# Patient Record
Sex: FEMALE | Race: WHITE | Employment: UNEMPLOYED | URBAN - METROPOLITAN AREA
[De-identification: names, ages, dates, MRNs, and addresses within clinical notes are randomized per-mention and may not be internally consistent; named-entity substitution may affect disease eponyms.]

---

## 2018-10-11 ENCOUNTER — HOSPITAL ENCOUNTER (OUTPATIENT)
Dept: CARDIOLOGY | Age: 83
Discharge: HOME | End: 2018-10-11
Payer: MEDICARE

## 2018-10-11 LAB
LV EF: 55 %
LVEF MODALITY: NORMAL

## 2018-10-11 PROCEDURE — 93306 TTE W/DOPPLER COMPLETE: CPT

## 2021-12-26 ENCOUNTER — APPOINTMENT (OUTPATIENT)
Dept: GENERAL RADIOLOGY | Age: 86
DRG: 552 | End: 2021-12-26
Payer: MEDICARE

## 2021-12-26 ENCOUNTER — HOSPITAL ENCOUNTER (INPATIENT)
Age: 86
LOS: 3 days | Discharge: NURSING, REHAB OR LONG-TERM CARE | DRG: 552 | End: 2021-12-29
Attending: EMERGENCY MEDICINE | Admitting: INTERNAL MEDICINE
Payer: MEDICARE

## 2021-12-26 DIAGNOSIS — D72.829 LEUKOCYTOSIS, UNSPECIFIED TYPE: ICD-10-CM

## 2021-12-26 DIAGNOSIS — E87.8 HYPOCHLOREMIA: ICD-10-CM

## 2021-12-26 DIAGNOSIS — E87.1 HYPONATREMIA: ICD-10-CM

## 2021-12-26 DIAGNOSIS — R26.2 AMBULATORY DYSFUNCTION: Primary | ICD-10-CM

## 2021-12-26 LAB
ALBUMIN SERPL-MCNC: 3.9 G/DL (ref 3.5–5.2)
ALP BLD-CCNC: 165 U/L (ref 35–104)
ALT SERPL-CCNC: 15 U/L (ref 0–32)
ANION GAP SERPL CALCULATED.3IONS-SCNC: 16 MMOL/L (ref 7–16)
AST SERPL-CCNC: 30 U/L (ref 0–31)
BACTERIA: ABNORMAL /HPF
BASOPHILS ABSOLUTE: 0.04 E9/L (ref 0–0.2)
BASOPHILS RELATIVE PERCENT: 0.3 % (ref 0–2)
BILIRUB SERPL-MCNC: 0.7 MG/DL (ref 0–1.2)
BILIRUBIN URINE: NEGATIVE
BLOOD, URINE: ABNORMAL
BUN BLDV-MCNC: 15 MG/DL (ref 6–23)
CALCIUM SERPL-MCNC: 9.5 MG/DL (ref 8.6–10.2)
CHLORIDE BLD-SCNC: 88 MMOL/L (ref 98–107)
CLARITY: CLEAR
CO2: 24 MMOL/L (ref 22–29)
COLOR: YELLOW
CREAT SERPL-MCNC: 0.5 MG/DL (ref 0.5–1)
EOSINOPHILS ABSOLUTE: 0.01 E9/L (ref 0.05–0.5)
EOSINOPHILS RELATIVE PERCENT: 0.1 % (ref 0–6)
GFR AFRICAN AMERICAN: >60
GFR NON-AFRICAN AMERICAN: >60 ML/MIN/1.73
GLUCOSE BLD-MCNC: 104 MG/DL (ref 74–99)
GLUCOSE URINE: NEGATIVE MG/DL
HCT VFR BLD CALC: 40.5 % (ref 34–48)
HEMOGLOBIN: 13.4 G/DL (ref 11.5–15.5)
IMMATURE GRANULOCYTES #: 0.08 E9/L
IMMATURE GRANULOCYTES %: 0.5 % (ref 0–5)
KETONES, URINE: ABNORMAL MG/DL
LEUKOCYTE ESTERASE, URINE: NEGATIVE
LYMPHOCYTES ABSOLUTE: 0.83 E9/L (ref 1.5–4)
LYMPHOCYTES RELATIVE PERCENT: 5.2 % (ref 20–42)
MCH RBC QN AUTO: 30.9 PG (ref 26–35)
MCHC RBC AUTO-ENTMCNC: 33.1 % (ref 32–34.5)
MCV RBC AUTO: 93.3 FL (ref 80–99.9)
MONOCYTES ABSOLUTE: 1.21 E9/L (ref 0.1–0.95)
MONOCYTES RELATIVE PERCENT: 7.6 % (ref 2–12)
NEUTROPHILS ABSOLUTE: 13.69 E9/L (ref 1.8–7.3)
NEUTROPHILS RELATIVE PERCENT: 86.3 % (ref 43–80)
NITRITE, URINE: NEGATIVE
PDW BLD-RTO: 12.2 FL (ref 11.5–15)
PH UA: 6.5 (ref 5–9)
PLATELET # BLD: 320 E9/L (ref 130–450)
PMV BLD AUTO: 11 FL (ref 7–12)
POTASSIUM SERPL-SCNC: 3.8 MMOL/L (ref 3.5–5)
PROTEIN UA: NEGATIVE MG/DL
RBC # BLD: 4.34 E12/L (ref 3.5–5.5)
RBC UA: ABNORMAL /HPF (ref 0–2)
SODIUM BLD-SCNC: 128 MMOL/L (ref 132–146)
SPECIFIC GRAVITY UA: 1.01 (ref 1–1.03)
TOTAL PROTEIN: 7.8 G/DL (ref 6.4–8.3)
TROPONIN, HIGH SENSITIVITY: 21 NG/L (ref 0–9)
TROPONIN, HIGH SENSITIVITY: 26 NG/L (ref 0–9)
UROBILINOGEN, URINE: 0.2 E.U./DL
WBC # BLD: 15.9 E9/L (ref 4.5–11.5)
WBC UA: ABNORMAL /HPF (ref 0–5)

## 2021-12-26 PROCEDURE — 81001 URINALYSIS AUTO W/SCOPE: CPT

## 2021-12-26 PROCEDURE — G0378 HOSPITAL OBSERVATION PER HR: HCPCS

## 2021-12-26 PROCEDURE — 72100 X-RAY EXAM L-S SPINE 2/3 VWS: CPT

## 2021-12-26 PROCEDURE — 1200000000 HC SEMI PRIVATE

## 2021-12-26 PROCEDURE — 6370000000 HC RX 637 (ALT 250 FOR IP): Performed by: INTERNAL MEDICINE

## 2021-12-26 PROCEDURE — 99223 1ST HOSP IP/OBS HIGH 75: CPT | Performed by: INTERNAL MEDICINE

## 2021-12-26 PROCEDURE — 84484 ASSAY OF TROPONIN QUANT: CPT

## 2021-12-26 PROCEDURE — 2580000003 HC RX 258: Performed by: EMERGENCY MEDICINE

## 2021-12-26 PROCEDURE — 2580000003 HC RX 258: Performed by: INTERNAL MEDICINE

## 2021-12-26 PROCEDURE — 80053 COMPREHEN METABOLIC PANEL: CPT

## 2021-12-26 PROCEDURE — 85025 COMPLETE CBC W/AUTO DIFF WBC: CPT

## 2021-12-26 PROCEDURE — 99284 EMERGENCY DEPT VISIT MOD MDM: CPT

## 2021-12-26 PROCEDURE — 93005 ELECTROCARDIOGRAM TRACING: CPT | Performed by: PHYSICIAN ASSISTANT

## 2021-12-26 PROCEDURE — 71046 X-RAY EXAM CHEST 2 VIEWS: CPT

## 2021-12-26 RX ORDER — POLYETHYLENE GLYCOL 3350 17 G/17G
17 POWDER, FOR SOLUTION ORAL DAILY PRN
Status: DISCONTINUED | OUTPATIENT
Start: 2021-12-26 | End: 2021-12-29 | Stop reason: HOSPADM

## 2021-12-26 RX ORDER — ACETAMINOPHEN 650 MG/1
650 SUPPOSITORY RECTAL EVERY 6 HOURS PRN
Status: DISCONTINUED | OUTPATIENT
Start: 2021-12-26 | End: 2021-12-29 | Stop reason: HOSPADM

## 2021-12-26 RX ORDER — METHOCARBAMOL 500 MG/1
1000 TABLET, FILM COATED ORAL 4 TIMES DAILY
Status: DISCONTINUED | OUTPATIENT
Start: 2021-12-26 | End: 2021-12-29 | Stop reason: HOSPADM

## 2021-12-26 RX ORDER — SODIUM CHLORIDE 9 MG/ML
25 INJECTION, SOLUTION INTRAVENOUS PRN
Status: DISCONTINUED | OUTPATIENT
Start: 2021-12-26 | End: 2021-12-29 | Stop reason: HOSPADM

## 2021-12-26 RX ORDER — SODIUM CHLORIDE 0.9 % (FLUSH) 0.9 %
10 SYRINGE (ML) INJECTION PRN
Status: DISCONTINUED | OUTPATIENT
Start: 2021-12-26 | End: 2021-12-29 | Stop reason: HOSPADM

## 2021-12-26 RX ORDER — SIMVASTATIN 20 MG
20 TABLET ORAL NIGHTLY
COMMUNITY

## 2021-12-26 RX ORDER — ACETAMINOPHEN 325 MG/1
650 TABLET ORAL EVERY 6 HOURS PRN
Status: DISCONTINUED | OUTPATIENT
Start: 2021-12-26 | End: 2021-12-29 | Stop reason: HOSPADM

## 2021-12-26 RX ORDER — METOPROLOL SUCCINATE 50 MG/1
50 TABLET, EXTENDED RELEASE ORAL DAILY
COMMUNITY

## 2021-12-26 RX ORDER — LEVOTHYROXINE SODIUM 0.05 MG/1
50 TABLET ORAL DAILY
COMMUNITY

## 2021-12-26 RX ORDER — ERGOCALCIFEROL 1.25 MG/1
50000 CAPSULE ORAL
COMMUNITY

## 2021-12-26 RX ORDER — SODIUM CHLORIDE 0.9 % (FLUSH) 0.9 %
10 SYRINGE (ML) INJECTION EVERY 12 HOURS SCHEDULED
Status: DISCONTINUED | OUTPATIENT
Start: 2021-12-26 | End: 2021-12-29 | Stop reason: HOSPADM

## 2021-12-26 RX ORDER — LIDOCAINE 4 G/G
1 PATCH TOPICAL DAILY
Status: DISCONTINUED | OUTPATIENT
Start: 2021-12-27 | End: 2021-12-29 | Stop reason: HOSPADM

## 2021-12-26 RX ORDER — HYDROCODONE BITARTRATE AND ACETAMINOPHEN 5; 325 MG/1; MG/1
1 TABLET ORAL EVERY 6 HOURS PRN
Status: DISCONTINUED | OUTPATIENT
Start: 2021-12-26 | End: 2021-12-29 | Stop reason: HOSPADM

## 2021-12-26 RX ORDER — SODIUM CHLORIDE 9 MG/ML
INJECTION, SOLUTION INTRAVENOUS CONTINUOUS
Status: DISCONTINUED | OUTPATIENT
Start: 2021-12-26 | End: 2021-12-29 | Stop reason: HOSPADM

## 2021-12-26 RX ORDER — 0.9 % SODIUM CHLORIDE 0.9 %
1000 INTRAVENOUS SOLUTION INTRAVENOUS ONCE
Status: COMPLETED | OUTPATIENT
Start: 2021-12-26 | End: 2021-12-26

## 2021-12-26 RX ADMIN — METHOCARBAMOL TABLETS 1000 MG: 500 TABLET, COATED ORAL at 22:39

## 2021-12-26 RX ADMIN — SODIUM CHLORIDE: 9 INJECTION, SOLUTION INTRAVENOUS at 22:40

## 2021-12-26 RX ADMIN — SODIUM CHLORIDE 1000 ML: 9 INJECTION, SOLUTION INTRAVENOUS at 18:00

## 2021-12-26 NOTE — ED NOTES
Department of Emergency Medicine  FIRST PROVIDER TRIAGE NOTE             Independent MLP           12/26/21  12:00 PM EST    Date of Encounter: 12/26/21   MRN: [id number]      HPI: Yousuf Chacon is a 80 y.o. female who presents to the ED for Leg Pain (leg pain, weakness)     Patient presenting with weakness and shakiness the past few days. ROS: Negative for cp or sob. PE: Gen Appearance/Constitutional: alert  HEENT: NC/NT. PERRLA,  Airway patent. Initial Plan of Care: All treatment areas with department are currently occupied. Plan to order/Initiate the following while awaiting opening in ED: labs, EKG and imaging studies.     Initial Plan of Care: Initiate Treatment-Testing, Proceed toTreatment Area When Bed Available for ED Attending/MLP to Continue Care    Electronically signed by Rodney Chaves PA-C   DD: 12/26/21       Rodney Chaves PA-C  12/26/21 4489

## 2021-12-26 NOTE — ED PROVIDER NOTES
HPI:  12/26/21, Time: 3:11 PM TRINITY Mayer is a 80 y.o. female presenting to the ED for right hip pain which has been present since September. Symptoms have been moderate in severity, constant, worsened with walking and movement, alleviated by nothing. She states that she saw her doctor last week who sent her to start imaging as an outpatient to undergo x-rays of her hip. I was able to review the results in care everywhere. Patient was noted to have mild to moderate degenerative changes in her hips. She states she was placed on pain medication. States that after taking the pain medication she developed weakness in both of her legs. She said no falls or trauma. No bowel or bladder incontinence or retention, saddle anesthesia, or focal numbness or weakness. She has been ambulatory. No fevers or recent illness. States that she has had injections in her hips in the past which have helped her pain. States she did develop some burning with urination today. She also states that she has had a cough occasionally productive of sputum since the summer. No known exposures to COVID-19. No fevers, chest pain, or difficulty breathing. Review of Systems:   Pertinent positives and negatives are stated within HPI, all other systems reviewed and are negative.          --------------------------------------------- PAST HISTORY ---------------------------------------------  Past Medical History:  has no past medical history on file. Past Surgical History:  has no past surgical history on file. Social History:      Family History: family history is not on file. The patients home medications have been reviewed.     Allergies: Pcn [penicillins]    -------------------------------------------------- RESULTS -------------------------------------------------  All laboratory and radiology results have been personally reviewed by myself   LABS:  Results for orders placed or performed during the hospital encounter of 12/26/21   CBC Auto Differential   Result Value Ref Range    WBC 15.9 (H) 4.5 - 11.5 E9/L    RBC 4.34 3.50 - 5.50 E12/L    Hemoglobin 13.4 11.5 - 15.5 g/dL    Hematocrit 40.5 34.0 - 48.0 %    MCV 93.3 80.0 - 99.9 fL    MCH 30.9 26.0 - 35.0 pg    MCHC 33.1 32.0 - 34.5 %    RDW 12.2 11.5 - 15.0 fL    Platelets 383 660 - 661 E9/L    MPV 11.0 7.0 - 12.0 fL    Neutrophils % 86.3 (H) 43.0 - 80.0 %    Immature Granulocytes % 0.5 0.0 - 5.0 %    Lymphocytes % 5.2 (L) 20.0 - 42.0 %    Monocytes % 7.6 2.0 - 12.0 %    Eosinophils % 0.1 0.0 - 6.0 %    Basophils % 0.3 0.0 - 2.0 %    Neutrophils Absolute 13.69 (H) 1.80 - 7.30 E9/L    Immature Granulocytes # 0.08 E9/L    Lymphocytes Absolute 0.83 (L) 1.50 - 4.00 E9/L    Monocytes Absolute 1.21 (H) 0.10 - 0.95 E9/L    Eosinophils Absolute 0.01 (L) 0.05 - 0.50 E9/L    Basophils Absolute 0.04 0.00 - 0.20 E9/L   Comprehensive Metabolic Panel   Result Value Ref Range    Sodium 128 (L) 132 - 146 mmol/L    Potassium 3.8 3.5 - 5.0 mmol/L    Chloride 88 (L) 98 - 107 mmol/L    CO2 24 22 - 29 mmol/L    Anion Gap 16 7 - 16 mmol/L    Glucose 104 (H) 74 - 99 mg/dL    BUN 15 6 - 23 mg/dL    CREATININE 0.5 0.5 - 1.0 mg/dL    GFR Non-African American >60 >=60 mL/min/1.73    GFR African American >60     Calcium 9.5 8.6 - 10.2 mg/dL    Total Protein 7.8 6.4 - 8.3 g/dL    Albumin 3.9 3.5 - 5.2 g/dL    Total Bilirubin 0.7 0.0 - 1.2 mg/dL    Alkaline Phosphatase 165 (H) 35 - 104 U/L    ALT 15 0 - 32 U/L    AST 30 0 - 31 U/L   Troponin   Result Value Ref Range    Troponin, High Sensitivity 26 (H) 0 - 9 ng/L   Urinalysis   Result Value Ref Range    Color, UA Yellow Straw/Yellow    Clarity, UA Clear Clear    Glucose, Ur Negative Negative mg/dL    Bilirubin Urine Negative Negative    Ketones, Urine TRACE (A) Negative mg/dL    Specific Gravity, UA 1.010 1.005 - 1.030    Blood, Urine TRACE-LYSED Negative    pH, UA 6.5 5.0 - 9.0    Protein, UA Negative Negative mg/dL    Urobilinogen, Urine 0.2 ROM. No leg swelling. No numbness. Skin: warm and dry without rash  Neurologic: GCS 15, no focal motor or sensory deficits. 5/5 strength of bilateral upper and lower extremities. Normal sensation to all extremities. Psych: Normal Affect. Behavior normal.      ------------------------------ ED COURSE/MEDICAL DECISION MAKING----------------------  Medications   0.9 % sodium chloride bolus (1,000 mLs IntraVENous New Bag 12/26/21 1800)       Medical Decision Making/ED COURSE:   Patient is a 79-year-old female presenting with atraumatic right hip pain, dysuria, and leg weakness. Hip pain appears radicular from back on exam. She has no signs or symptoms of spinal cord compression and no history of trauma. In the ED, patient was hemodynamically stable and afebrile. On exam, objectively the patient had no weakness to her extremities. She was nontoxic on evaluation. No evidence of septic joint to her hip. Labs and imaging obtained. I reviewed and interpreted labs. Labs were significant for hyponatremia and leukocytosis. She has no fevers or obvious source of infection. She does appear dehydrated on exam. She as hydrated. Imaging unremarkable. Patient ambulated, and she states she does not feel safe for home and believes she may need rehab. Will admit for hydration and PT/OT. Patient remained hemodynamically stable throughout ED course. ED Course as of 12/26/21 2329   Sun Dec 26, 2021   9243 I reviewed the patient's chart. ECHO 10/11/18:   Left ventricular size is grossly normal.   Mild left ventricular concentric hypertrophy noted. Ejection fraction is visually estimated at 55%. No regional wall motion abnormalities seen. Normal left ventricular diastolic filling pattern for age. Severe aortic stenosis is present. Mild-to-moderate aortic regurgitation is noted. [JA]   1801 EKG: This EKG is signed and interpreted by me.     Rate: 94  Rhythm: Sinus  Interpretation: Sinus rhythm, left axis deviation, LVH, normal MD interval, normal QRS, normal QT interval, no acute ST or T wave changes  Comparison: no previous EKG available [JA]   1952 Patient weak with ambulation and feels unsafe for discharge home. She is requesting admission for placement to rehab. [JA]   2004 Hospitalist was consulted. Dr. Korin Donnelly accepted the patient for admission. [JA]      ED Course User Index  [JA] Kelli Rea MD       Counseling: The emergency provider has spoken with the patient and discussed todays results, in addition to providing specific details for the plan of care and counseling regarding the diagnosis and prognosis. Questions are answered at this time and they are agreeable with the plan.      --------------------------------- IMPRESSION AND DISPOSITION ---------------------------------    IMPRESSION  1. Ambulatory dysfunction    2. Hyponatremia    3. Leukocytosis, unspecified type        DISPOSITION  Disposition: Admit to med/surg floor  Patient condition is stable      NOTE: This report was transcribed using voice recognition software.  Every effort was made to ensure accuracy; however, inadvertent computerized transcription errors may be present    I, Kelli Rea MD, am the primary provider of this record       Kelli Rea MD  12/26/21 5789

## 2021-12-27 PROBLEM — M54.9 INTRACTABLE BACK PAIN: Status: ACTIVE | Noted: 2021-12-27

## 2021-12-27 LAB
ANION GAP SERPL CALCULATED.3IONS-SCNC: 15 MMOL/L (ref 7–16)
BASOPHILS ABSOLUTE: 0.05 E9/L (ref 0–0.2)
BASOPHILS RELATIVE PERCENT: 0.4 % (ref 0–2)
BUN BLDV-MCNC: 11 MG/DL (ref 6–23)
CALCIUM SERPL-MCNC: 9.3 MG/DL (ref 8.6–10.2)
CHLORIDE BLD-SCNC: 97 MMOL/L (ref 98–107)
CO2: 23 MMOL/L (ref 22–29)
CREAT SERPL-MCNC: 0.6 MG/DL (ref 0.5–1)
EKG ATRIAL RATE: 94 BPM
EKG P AXIS: 46 DEGREES
EKG P-R INTERVAL: 190 MS
EKG Q-T INTERVAL: 354 MS
EKG QRS DURATION: 84 MS
EKG QTC CALCULATION (BAZETT): 442 MS
EKG R AXIS: -62 DEGREES
EKG T AXIS: 81 DEGREES
EKG VENTRICULAR RATE: 94 BPM
EOSINOPHILS ABSOLUTE: 0.02 E9/L (ref 0.05–0.5)
EOSINOPHILS RELATIVE PERCENT: 0.1 % (ref 0–6)
GFR AFRICAN AMERICAN: >60
GFR NON-AFRICAN AMERICAN: >60 ML/MIN/1.73
GLUCOSE BLD-MCNC: 182 MG/DL (ref 74–99)
HCT VFR BLD CALC: 43.2 % (ref 34–48)
HEMOGLOBIN: 14.4 G/DL (ref 11.5–15.5)
IMMATURE GRANULOCYTES #: 0.07 E9/L
IMMATURE GRANULOCYTES %: 0.5 % (ref 0–5)
LYMPHOCYTES ABSOLUTE: 1.26 E9/L (ref 1.5–4)
LYMPHOCYTES RELATIVE PERCENT: 8.8 % (ref 20–42)
MCH RBC QN AUTO: 30.6 PG (ref 26–35)
MCHC RBC AUTO-ENTMCNC: 33.3 % (ref 32–34.5)
MCV RBC AUTO: 91.9 FL (ref 80–99.9)
MONOCYTES ABSOLUTE: 1.21 E9/L (ref 0.1–0.95)
MONOCYTES RELATIVE PERCENT: 8.5 % (ref 2–12)
NEUTROPHILS ABSOLUTE: 11.64 E9/L (ref 1.8–7.3)
NEUTROPHILS RELATIVE PERCENT: 81.7 % (ref 43–80)
PDW BLD-RTO: 12.3 FL (ref 11.5–15)
PLATELET # BLD: 395 E9/L (ref 130–450)
PMV BLD AUTO: 10.9 FL (ref 7–12)
POTASSIUM REFLEX MAGNESIUM: 3.6 MMOL/L (ref 3.5–5)
RBC # BLD: 4.7 E12/L (ref 3.5–5.5)
SODIUM BLD-SCNC: 135 MMOL/L (ref 132–146)
WBC # BLD: 14.3 E9/L (ref 4.5–11.5)

## 2021-12-27 PROCEDURE — 80048 BASIC METABOLIC PNL TOTAL CA: CPT

## 2021-12-27 PROCEDURE — 97165 OT EVAL LOW COMPLEX 30 MIN: CPT

## 2021-12-27 PROCEDURE — 36415 COLL VENOUS BLD VENIPUNCTURE: CPT

## 2021-12-27 PROCEDURE — 6360000002 HC RX W HCPCS: Performed by: INTERNAL MEDICINE

## 2021-12-27 PROCEDURE — G0378 HOSPITAL OBSERVATION PER HR: HCPCS

## 2021-12-27 PROCEDURE — 99233 SBSQ HOSP IP/OBS HIGH 50: CPT | Performed by: INTERNAL MEDICINE

## 2021-12-27 PROCEDURE — 96372 THER/PROPH/DIAG INJ SC/IM: CPT

## 2021-12-27 PROCEDURE — 85025 COMPLETE CBC W/AUTO DIFF WBC: CPT

## 2021-12-27 PROCEDURE — 6370000000 HC RX 637 (ALT 250 FOR IP): Performed by: INTERNAL MEDICINE

## 2021-12-27 PROCEDURE — 97161 PT EVAL LOW COMPLEX 20 MIN: CPT

## 2021-12-27 PROCEDURE — 97530 THERAPEUTIC ACTIVITIES: CPT

## 2021-12-27 PROCEDURE — 93010 ELECTROCARDIOGRAM REPORT: CPT | Performed by: INTERNAL MEDICINE

## 2021-12-27 PROCEDURE — 1200000000 HC SEMI PRIVATE

## 2021-12-27 RX ORDER — METOPROLOL SUCCINATE 50 MG/1
50 TABLET, EXTENDED RELEASE ORAL DAILY
Status: DISCONTINUED | OUTPATIENT
Start: 2021-12-27 | End: 2021-12-29 | Stop reason: HOSPADM

## 2021-12-27 RX ORDER — LEVOTHYROXINE SODIUM 0.05 MG/1
50 TABLET ORAL DAILY
Status: DISCONTINUED | OUTPATIENT
Start: 2021-12-28 | End: 2021-12-29 | Stop reason: HOSPADM

## 2021-12-27 RX ORDER — ATORVASTATIN CALCIUM 20 MG/1
20 TABLET, FILM COATED ORAL DAILY
Status: DISCONTINUED | OUTPATIENT
Start: 2021-12-27 | End: 2021-12-29 | Stop reason: HOSPADM

## 2021-12-27 RX ADMIN — METHOCARBAMOL TABLETS 1000 MG: 500 TABLET, COATED ORAL at 13:00

## 2021-12-27 RX ADMIN — METOPROLOL SUCCINATE 50 MG: 50 TABLET, EXTENDED RELEASE ORAL at 21:26

## 2021-12-27 RX ADMIN — METHOCARBAMOL TABLETS 1000 MG: 500 TABLET, COATED ORAL at 17:09

## 2021-12-27 RX ADMIN — METHOCARBAMOL TABLETS 1000 MG: 500 TABLET, COATED ORAL at 09:00

## 2021-12-27 RX ADMIN — HYDROCODONE BITARTRATE AND ACETAMINOPHEN 1 TABLET: 5; 325 TABLET ORAL at 01:23

## 2021-12-27 RX ADMIN — HYDROCODONE BITARTRATE AND ACETAMINOPHEN 1 TABLET: 5; 325 TABLET ORAL at 21:27

## 2021-12-27 RX ADMIN — HYDROCODONE BITARTRATE AND ACETAMINOPHEN 1 TABLET: 5; 325 TABLET ORAL at 09:41

## 2021-12-27 RX ADMIN — ENOXAPARIN SODIUM 40 MG: 100 INJECTION SUBCUTANEOUS at 09:41

## 2021-12-27 RX ADMIN — ATORVASTATIN CALCIUM 20 MG: 20 TABLET, FILM COATED ORAL at 21:26

## 2021-12-27 RX ADMIN — POLYETHYLENE GLYCOL 3350 17 G: 17 POWDER, FOR SOLUTION ORAL at 09:41

## 2021-12-27 ASSESSMENT — PAIN DESCRIPTION - FREQUENCY
FREQUENCY: INTERMITTENT
FREQUENCY: INTERMITTENT

## 2021-12-27 ASSESSMENT — PAIN SCALES - GENERAL
PAINLEVEL_OUTOF10: 8
PAINLEVEL_OUTOF10: 8
PAINLEVEL_OUTOF10: 7

## 2021-12-27 ASSESSMENT — PAIN DESCRIPTION - PAIN TYPE
TYPE: ACUTE PAIN
TYPE: ACUTE PAIN

## 2021-12-27 ASSESSMENT — PAIN DESCRIPTION - PROGRESSION
CLINICAL_PROGRESSION: GRADUALLY IMPROVING
CLINICAL_PROGRESSION: GRADUALLY IMPROVING

## 2021-12-27 ASSESSMENT — PAIN DESCRIPTION - DESCRIPTORS
DESCRIPTORS: ACHING
DESCRIPTORS: ACHING

## 2021-12-27 ASSESSMENT — PAIN DESCRIPTION - ONSET
ONSET: GRADUAL
ONSET: GRADUAL

## 2021-12-27 ASSESSMENT — PAIN DESCRIPTION - LOCATION
LOCATION: HIP
LOCATION: HIP

## 2021-12-27 ASSESSMENT — PAIN DESCRIPTION - ORIENTATION
ORIENTATION: RIGHT
ORIENTATION: MID;RIGHT

## 2021-12-27 ASSESSMENT — PAIN - FUNCTIONAL ASSESSMENT: PAIN_FUNCTIONAL_ASSESSMENT: PREVENTS OR INTERFERES SOME ACTIVE ACTIVITIES AND ADLS

## 2021-12-27 ASSESSMENT — PAIN DESCRIPTION - DIRECTION: RADIATING_TOWARDS: BACK

## 2021-12-27 NOTE — PLAN OF CARE
Problem: Falls - Risk of:  Goal: Will remain free from falls  Description: Will remain free from falls  Outcome: Met This Shift  Goal: Absence of physical injury  Description: Absence of physical injury  Outcome: Met This Shift     Problem: Pain:  Description: Pain management should include both nonpharmacologic and pharmacologic interventions.   Goal: Pain level will decrease  Description: Pain level will decrease  Outcome: Ongoing  Goal: Control of acute pain  Description: Control of acute pain  Outcome: Ongoing

## 2021-12-27 NOTE — PROGRESS NOTES
normal rate, normal S1 and S2 and no carotid bruits  Abdomen: soft, non-tender, non-distended, normal bowel sounds, no masses or organomegaly  Extremities: no cyanosis, no clubbing and no edema  Neurologic: no cranial nerve deficit and speech normal        Recent Labs     12/26/21  1515 12/27/21  0810   * 135   K 3.8 3.6   CL 88* 97*   CO2 24 23   BUN 15 11   CREATININE 0.5 0.6   GLUCOSE 104* 182*   CALCIUM 9.5 9.3       Recent Labs     12/26/21  1515 12/27/21  0810   WBC 15.9* 14.3*   RBC 4.34 4.70   HGB 13.4 14.4   HCT 40.5 43.2   MCV 93.3 91.9   MCH 30.9 30.6   MCHC 33.1 33.3   RDW 12.2 12.3    395   MPV 11.0 10.9         NOTE: This report was transcribed using voice recognition software. Every effort was made to ensure accuracy; however, inadvertent computerized transcription errors may be present.   Electronically signed by Vivi Beard MD on 12/27/2021 at 3:57 PM

## 2021-12-27 NOTE — H&P
[facility] History and Physical      CHIEF COMPLAINT:  Hip pain    History of Present Illness: 69-year-old female who presents to the hospital due to pain in her hips and legs for the past few days. She states that after ambulating she developed the pain that initially started on the left buttock radiating along the posterior aspect of her left leg. Her pain continued and she now feels it going down the right leg as well. Has limited her ability to ambulate. He therefore presents to ED for further evaluation. Denies any fall. No bladder or bowel dysfunction. Due to her inability to ambulate she has not been able to eat and drink well. Presented to the ED for further evaluation of this. X-ray showed no fracture her back. REVIEW OF SYSTEMS:  A comprehensive 14 point review of systems was negative except for: what is in the HPI    PMH:  No past medical history on file. Surgical History:  No past surgical history on file.     Medications Prior to Admission:    Prior to Admission medications    Not on File       Allergies:    Pcn [penicillins]    Social History:      No current tobacco use  Family History:   Noncontributory    PHYSICAL EXAM:  Vitals:  BP (!) 185/81   Pulse 96   Temp 98.5 °F (36.9 °C) (Oral)   Resp 16   SpO2 94%   General Appearance: alert and oriented to person, place and time and in no acute distress  Skin: warm and dry, turgor not diminished  Head: normocephalic and atraumatic  Eyes: pupils equal, round, and reactive to light, extraocular eye movements intact, conjunctivae normal  Neck: neck supple and non tender without mass   Pulmonary/Chest: clear to auscultation bilaterally- no wheezes, rales or rhonchi, normal air movement, no respiratory distress  Cardiovascular: normal rate, normal S1 and S2 and 3 out of 6 systolic ejection murmur: She recalls being told having this  Abdomen: soft, non-tender, non-distended, normal bowel sounds, no masses or organomegaly  Extremities: no cyanosis, no clubbing and no edema. Strength evaluation of legs limited as she reports low effort due to weakness  Neurologic: no cranial nerve deficit and speech normal        LABS:  Recent Labs     12/26/21  1515   *   K 3.8   CL 88*   CO2 24   BUN 15   CREATININE 0.5   GLUCOSE 104*   CALCIUM 9.5       Recent Labs     12/26/21  1515   WBC 15.9*   RBC 4.34   HGB 13.4   HCT 40.5   MCV 93.3   MCH 30.9   MCHC 33.1   RDW 12.2      MPV 11.0       No results for input(s): POCGLU in the last 72 hours. Radiology:   XR LUMBAR SPINE (2-3 VIEWS)   Final Result   1. No definite acute osseous abnormality seen. 2. Advanced degenerative change. Scoliosis as noted above. XR CHEST (2 VW)   Final Result   Patchy bibasilar opacities. Differential includes atelectasis/scarring and   infection. EKG: No acute normality    ASSESSMENT/PLAN:  Ambulatory dysfunction  Acute lower back pain with radiculopathy  DJD of lumbar spine  Analgesics  PT/OT  Referral to social work for placement  May need further imaging if symptoms persist after a month    Hyponatremia  Generalized weakness  Likely hypovolemic. IV fluids and trend    Severe aortic stenosis  Avoid fluid overload  Follow-up with cardiology as outpatient    Code Status: DNR arrest  DVT prophylaxis: Lovenox      NOTE: This report was transcribed using voice recognition software. Every effort was made to ensure accuracy; however, inadvertent computerized transcription errors may be present.   Electronically signed by Muriel Queen MD on 12/26/2021 at 9:06 PM

## 2021-12-27 NOTE — CARE COORDINATION
Met with about diagnosis and discharge plan of care. Pt admit for ambulatory dysfunction. Pt lives alone in ranch home, 4 steps in. Has cane, wheeled walker and handicapped bathroom at home with grab bars. Pt was independent at home prior to admit but having difficulty at home recently. No home care services. Therapies completed. Pt would like [facility] with Milady at [facility].  Await call back-o    Referral given to Milady at [facility]

## 2021-12-27 NOTE — PROGRESS NOTES
Physical Therapy    Facility/Department: [facility] SURGERY  Initial Assessment    NAME: Navdeep Arcos  : 1930  MRN: [id number]    Date of Service: 2021      Attending Provider:  Andrés Britton MD    Evaluating PT:  Severiano Chill. Garland Vance P.T. Room #:  3328/3498-Y  Diagnosis:  Hypochloremia [E87.8]  Hyponatremia [E87.1]  Ambulatory dysfunction [R26.2]  Leukocytosis, unspecified type [D72.829], radiculopathy BLE  Precautions:  Falls    SUBJECTIVE:    Pt lives alone in a 1 story home with 4 stairs and 1 rail to enter. Pt ambulated with no AD, but has a cane and ww if needed. OBJECTIVE:   Initial Evaluation  Date: 21 Treatment Short Term/ Long Term   Goals   Was pt agreeable to Eval/treatment? yes     Does pt have pain? 7/10 RLE near her R hip while lying, but travels down RLE with standing and amb. Bed Mobility  Rolling: supervision  Supine to sit: supervision  Sit to supine: NA  Scooting: supervision  Independent    Transfers Sit to stand: MIN A  Stand to sit: MIN A  Stand pivot: MIN A with ww  Independent    Ambulation   15 feet x 2 reps with ww MIN A  150 feet with ww if needed Independent    Stair negotiation: ascended and descended NA, pt c/o pain and fatigue that limited amb distance. 4 steps with 1 rail Independent    AM-PAC 6 Clicks 81/38       BLE ROM is WFL. BLE strength is grossly 4-/5 to 4/5.    Sensation:  Pt denies numbness and tingling to extremities  Edema:  None note  Balance: sitting is supervision and standing with ww is SBA/MIN A  Endurance: fair-    Patient education  Pt educated on hand placement during transfers and gait sequence with ww    Patient response to education:   Pt verbalized understanding Pt demonstrated skill Pt requires further education in this area   yes Yes with VCs yes     ASSESSMENT:    Conditions Requiring Skilled Therapeutic Intervention:    [x]Decreased strength     []Decreased ROM  [x]Decreased functional mobility  [x]Decreased balance [x]Decreased endurance   []Decreased posture  []Decreased sensation  []Decreased coordination   []Decreased vision  []Decreased safety awareness   [x]Increased pain   Comments:  Pt was in bed and reported need to use BR. She stood up with ww and MIN A and c/o increased hip and RLE with standing and walking. She transferred on/off commode with MIN A using grab bar and then performed self hygiene care. She stood leaning against sink for support with SBA while she washed her hands. Pt then walked back to chair next to her bed and asked to sit down due to c/o fatigue and increasing pain in RLE. Pt is a fall risk at this time. Treatment:  Patient practiced and was instructed in the following treatment:     Bed mobility, transfers, ADLs, and gait with ww to improve functional strength and endurance. Pt was left sitting up in chair with call light left by patient. Pt's/ family goals   1. To decrease her pain and increase her strength and endurance so she can safely go home. Patient and or family understand(s) diagnosis, prognosis, and plan of care. PHYSICAL THERAPY PLAN OF CARE:    PT POC is established based on physician order and patient diagnosis     Referring provider/PT Order:  PT eval and treat  Diagnosis:  Hypochloremia [E87.8]  Hyponatremia [E87.1]  Ambulatory dysfunction [R26.2]  Leukocytosis, unspecified type [D72.829]  Specific instructions for next treatment:  Increase amb distance as pt is able.     Current Treatment Recommendations:     [x] Strengthening to improve independence with functional mobility   [x] ROM to improve flexibility and decrease spasm and pain which will help promote independence with functional mobility   [x] Balance Training to improve static/dynamic balance and to reduce fall risk  [x] Endurance Training to improve activity tolerance during functional mobility   [x] Transfer Training to improve safety and independence with all functional transfers   [x] Gait Training to improve gait mechanics, endurance and assess need for appropriate assistive device  [x] Stair Training in preparation for safe discharge home and/or into the community   [] Positioning to prevent skin breakdown and contractures  [] Safety and Education Training   [x] Patient/Caregiver Education   [] HEP  [] Other     PT long term treatment goals are located in above grid    Frequency of treatments: 2-5x/week x 1-2 weeks. Time in  07:45  Time out  08:10    Total Treatment Time 12 minutes     Evaluation Time includes thorough review of current medical information, gathering information on past medical history/social history and prior level of function, completion of standardized testing/informal observation of tasks, assessment of data and education on plan of care and goals. CPT codes:  [x] Low Complexity PT evaluation 76185  [] Moderate Complexity PT evaluation 25967  [] High Complexity PT evaluation 22183  [] PT Re-evaluation 47934  [] Gait training 00496 ** minutes  [] Manual therapy 02992 ** minutes  [x] Therapeutic activities 92385 12 minutes  [] Therapeutic exercises 85666 ** minutes  [] Neuromuscular reeducation 81600 ** minutes     Esa Cruz., P.T.   License Number: [id number]

## 2021-12-27 NOTE — PROGRESS NOTES
Occupational Therapy  OCCUPATIONAL THERAPY INITIAL EVALUATION     Libby May [place], [place]        UDCP:                                                  Patient Name: Willie Rosenberg    MRN: [id number]    : 1930    Room: [address] [place] [address]      Evaluating OT: Anirudh Lyn OTR/L [id number]      Referring Provider: Lucas Felder MD     Specific Provider Orders/Date: OT eval and treat 21      Diagnosis: Hypochloremia [E87.8]  Hyponatremia [E87.1]  Ambulatory dysfunction [R26.2]  Leukocytosis, unspecified type [D72.829]       Precautions:  Fall Risk     Assessment of current deficits    [x] Functional mobility  [x]ADLs  [x] Strength               []Cognition    [x] Functional transfers   [x] IADLs         [x] Safety Awareness   [x]Endurance    [] Fine Coordination              [x] Balance      [] Vision/perception   []Sensation     []Gross Motor Coordination  [] ROM  [] Delirium                   [] Motor Control     OT PLAN OF CARE   OT POC based on physician orders, patient diagnosis and results of clinical assessment    Frequency/Duration 2-4 days/wk for 2 weeks PRN   Specific OT Treatment Interventions to include:   * Instruction/training on adapted ADL techniques and AE recommendations to increase functional independence within precautions       * Training on energy conservation strategies, correct breathing pattern and techniques to improve independence/tolerance for self-care routine  * Functional transfer/mobility training/DME recommendations for increased independence, safety, and fall prevention  * Patient/Family education to increase follow through with safety techniques and functional independence  * Recommendation of environmental modifications for increased safety with functional transfers/mobility and ADLs  * Therapeutic exercise to improve motor endurance, ROM, and functional strength for ADLs/functional transfers  * Therapeutic activities to facilitate/challenge dynamic balance, stand tolerance for increased safety and independence with ADLs    Recommended Adaptive Equipment: TBD     Home Living: Pt lives alone in a 2 story house with 5 FLOR and 1 hand rail. Pt's bedroom and bathroom are on the 1st floor. Pt reports that laundry is in the basement. Bathroom setup: tub/shower with grab bars, elevated commode with grab bars around commode   Equipment owned: cane, wheeled walker    Prior Level of Function: independent with ADLs , independent with IADLs; functional mobility: no device; reports that she had been using cane PRN when having increase R hip pain    Pain Level: Pt reported pain in R hip but did not rate. RN notified of pt's request for pain pill. Cognition: A&O: 4/4; WFL command follow demonstrated. Pt was pleasant and agreeable to therapy.    Memory:  Fair+   Sequencing:  Fair+   Problem solving:  Fair+   Judgement/safety:  Fair+      Functional Assessment:  AM-PAC Daily Activity Raw Score: 18/24   Initial Eval Status  Date: 12/27/21 Treatment Status  Date: STGs = LTGs  Time frame: 10-14 days   Feeding Set up     Grooming CGA to stand at sink for hand and oral hygiene  Pt occasionally leaning on sink for BUE support  Mod I/I   UB Dressing CGA  Mod I/I   LB Dressing SBA to doff/don socks seated in chair  CGA for simulated pants   Mod I/I-with use of AD as appropriate/needed   Bathing CGA  Mod I/I -with use of AD as appropriate/needed   Toileting CGA/SBA for clothing management  I    Bed Mobility  Pt in chair      Functional Transfers Min A/CGA with wheeled walker  Sit<>stand from chair  Sit<>stand from commode  Cues for hand placement and safe technique  I    Functional Mobility CGA with wheeled walker  To and from bathroom  Cues for safe wheeled walker management and navigation  I -with device as needed to maximize independence with ADLs and functional task completion   Balance Sitting:     Static:  I Dynamic:SBA  Standing: CGA with wheeled walker  I for dynamic sitting balance and standing balance to maximize independence with ADLs and functional task completion   Activity Tolerance Fair with light activity  Good with ADL activity. Pt will demonstrate good understanding of education provided on EC/WS techniques    Visual/  Perceptual Glasses: yes                Additional long-term goal: Pt will increase functional independence to PLOF to allow pt to live in least restrictive environment. Hand Dominance R   AROM (PROM) Strength Additional Info:    RUE  WFL 4-/5 WFL  and FMC/dexterity noted during ADL tasks       LUE WFL 4-/5 WLF  and FMC/dexterity noted during ADL tasks       Hearing: WFL   Sensation:  No c/o numbness or tingling   Tone: WFL   Edema: none noted    Comments: Upon arrival patient sitting in chair. At end of session, patient returned to chair with call light and phone within reach, all lines and tubes intact. Overall patient demonstrated decreased independence and safety during completion of ADL/functional transfer/mobility tasks. Pt would benefit from continued skilled OT to increase safety and independence with completion of ADL/IADL tasks for functional independence and quality of life. Rehab Potential: Good for established goals     Patient / Family Goal: to return home    Patient and/or family were instructed on functional diagnosis, prognosis/goals and OT plan of care. Demonstrated fair understanding.      Eval Complexity: Low    Time In: 0830  Time Out: 0850    Min Units   OT Eval Low 97165  x  1   OT Eval Medium 56532      OT Eval High 00471      OT Re-Eval [id number]       Therapeutic Ex [id number]       Therapeutic Activities 92447       ADL/Self Care 67005       Orthotic Management 38214       Manual 12882     Neuro Re-Ed 26283       Non-Billable Time          Evaluation Time additionally includes thorough review of current medical information, gathering information on past medical history/social history and prior level of function, interpretation of standardized testing/informal observation of tasks, assessment of data and development of plan of care and goals.             Alli Hoff, OTR/L, [id number]

## 2021-12-27 NOTE — PLAN OF CARE
Problem: Falls - Risk of:  Goal: Will remain free from falls  Description: Will remain free from falls  12/27/2021 1728 by Josafat Ochoa RN  Outcome: Met This Shift  12/27/2021 0643 by Bridger Armijo RN  Outcome: Met This Shift  Goal: Absence of physical injury  Description: Absence of physical injury  12/27/2021 1728 by Josafat Ochoa RN  Outcome: Met This Shift  12/27/2021 0643 by Bridger Armijo RN  Outcome: Met This Shift     Problem: Pain:  Goal: Pain level will decrease  Description: Pain level will decrease  12/27/2021 1728 by Josafat Ochoa RN  Outcome: Met This Shift  12/27/2021 0643 by Bridger Armijo RN  Outcome: Ongoing  Goal: Control of acute pain  Description: Control of acute pain  12/27/2021 1728 by Josafat Ochoa RN  Outcome: Met This Shift  12/27/2021 0643 by Bridger Armijo RN  Outcome: Ongoing  Goal: Control of chronic pain  Description: Control of chronic pain  Outcome: Met This Shift

## 2021-12-28 ENCOUNTER — APPOINTMENT (OUTPATIENT)
Dept: MRI IMAGING | Age: 86
DRG: 552 | End: 2021-12-28
Payer: MEDICARE

## 2021-12-28 LAB
ANION GAP SERPL CALCULATED.3IONS-SCNC: 11 MMOL/L (ref 7–16)
BASOPHILS ABSOLUTE: 0.07 E9/L (ref 0–0.2)
BASOPHILS RELATIVE PERCENT: 0.5 % (ref 0–2)
BUN BLDV-MCNC: 10 MG/DL (ref 6–23)
CALCIUM SERPL-MCNC: 9 MG/DL (ref 8.6–10.2)
CHLORIDE BLD-SCNC: 95 MMOL/L (ref 98–107)
CO2: 25 MMOL/L (ref 22–29)
CREAT SERPL-MCNC: 0.4 MG/DL (ref 0.5–1)
EOSINOPHILS ABSOLUTE: 0.06 E9/L (ref 0.05–0.5)
EOSINOPHILS RELATIVE PERCENT: 0.5 % (ref 0–6)
GFR AFRICAN AMERICAN: >60
GFR NON-AFRICAN AMERICAN: >60 ML/MIN/1.73
GLUCOSE BLD-MCNC: 95 MG/DL (ref 74–99)
HCT VFR BLD CALC: 42.9 % (ref 34–48)
HEMOGLOBIN: 14.2 G/DL (ref 11.5–15.5)
IMMATURE GRANULOCYTES #: 0.08 E9/L
IMMATURE GRANULOCYTES %: 0.6 % (ref 0–5)
LYMPHOCYTES ABSOLUTE: 1.39 E9/L (ref 1.5–4)
LYMPHOCYTES RELATIVE PERCENT: 10.6 % (ref 20–42)
MCH RBC QN AUTO: 30.9 PG (ref 26–35)
MCHC RBC AUTO-ENTMCNC: 33.1 % (ref 32–34.5)
MCV RBC AUTO: 93.5 FL (ref 80–99.9)
MONOCYTES ABSOLUTE: 1.33 E9/L (ref 0.1–0.95)
MONOCYTES RELATIVE PERCENT: 10.1 % (ref 2–12)
NEUTROPHILS ABSOLUTE: 10.24 E9/L (ref 1.8–7.3)
NEUTROPHILS RELATIVE PERCENT: 77.7 % (ref 43–80)
PDW BLD-RTO: 12.1 FL (ref 11.5–15)
PLATELET # BLD: 330 E9/L (ref 130–450)
PMV BLD AUTO: 11.2 FL (ref 7–12)
POTASSIUM REFLEX MAGNESIUM: 3.9 MMOL/L (ref 3.5–5)
RBC # BLD: 4.59 E12/L (ref 3.5–5.5)
SODIUM BLD-SCNC: 131 MMOL/L (ref 132–146)
WBC # BLD: 13.2 E9/L (ref 4.5–11.5)

## 2021-12-28 PROCEDURE — 36415 COLL VENOUS BLD VENIPUNCTURE: CPT

## 2021-12-28 PROCEDURE — 80048 BASIC METABOLIC PNL TOTAL CA: CPT

## 2021-12-28 PROCEDURE — 99239 HOSP IP/OBS DSCHRG MGMT >30: CPT | Performed by: INTERNAL MEDICINE

## 2021-12-28 PROCEDURE — 6370000000 HC RX 637 (ALT 250 FOR IP): Performed by: INTERNAL MEDICINE

## 2021-12-28 PROCEDURE — 85025 COMPLETE CBC W/AUTO DIFF WBC: CPT

## 2021-12-28 PROCEDURE — 72148 MRI LUMBAR SPINE W/O DYE: CPT

## 2021-12-28 PROCEDURE — 2580000003 HC RX 258: Performed by: INTERNAL MEDICINE

## 2021-12-28 PROCEDURE — 96372 THER/PROPH/DIAG INJ SC/IM: CPT

## 2021-12-28 PROCEDURE — G0378 HOSPITAL OBSERVATION PER HR: HCPCS

## 2021-12-28 PROCEDURE — 6360000002 HC RX W HCPCS: Performed by: INTERNAL MEDICINE

## 2021-12-28 PROCEDURE — 1200000000 HC SEMI PRIVATE

## 2021-12-28 RX ORDER — HYDROCODONE BITARTRATE AND ACETAMINOPHEN 5; 325 MG/1; MG/1
1 TABLET ORAL EVERY 6 HOURS PRN
Refills: 0 | Status: SHIPPED | DISCHARGE
Start: 2021-12-28 | End: 2021-12-31

## 2021-12-28 RX ADMIN — ATORVASTATIN CALCIUM 20 MG: 20 TABLET, FILM COATED ORAL at 20:58

## 2021-12-28 RX ADMIN — METHOCARBAMOL TABLETS 1000 MG: 500 TABLET, COATED ORAL at 08:36

## 2021-12-28 RX ADMIN — METHOCARBAMOL TABLETS 1000 MG: 500 TABLET, COATED ORAL at 16:53

## 2021-12-28 RX ADMIN — HYDROCODONE BITARTRATE AND ACETAMINOPHEN 1 TABLET: 5; 325 TABLET ORAL at 20:58

## 2021-12-28 RX ADMIN — HYDROCODONE BITARTRATE AND ACETAMINOPHEN 1 TABLET: 5; 325 TABLET ORAL at 08:36

## 2021-12-28 RX ADMIN — LEVOTHYROXINE SODIUM 50 MCG: 50 TABLET ORAL at 07:00

## 2021-12-28 RX ADMIN — METOPROLOL SUCCINATE 50 MG: 50 TABLET, EXTENDED RELEASE ORAL at 08:36

## 2021-12-28 RX ADMIN — ENOXAPARIN SODIUM 40 MG: 100 INJECTION SUBCUTANEOUS at 08:37

## 2021-12-28 RX ADMIN — Medication 10 ML: at 08:38

## 2021-12-28 RX ADMIN — METHOCARBAMOL TABLETS 1000 MG: 500 TABLET, COATED ORAL at 13:00

## 2021-12-28 ASSESSMENT — PAIN DESCRIPTION - PAIN TYPE: TYPE: ACUTE PAIN

## 2021-12-28 ASSESSMENT — PAIN DESCRIPTION - PROGRESSION: CLINICAL_PROGRESSION: GRADUALLY IMPROVING

## 2021-12-28 ASSESSMENT — PAIN SCALES - GENERAL
PAINLEVEL_OUTOF10: 7
PAINLEVEL_OUTOF10: 8
PAINLEVEL_OUTOF10: 5
PAINLEVEL_OUTOF10: 1

## 2021-12-28 ASSESSMENT — PAIN DESCRIPTION - FREQUENCY: FREQUENCY: INTERMITTENT

## 2021-12-28 ASSESSMENT — PAIN DESCRIPTION - ORIENTATION: ORIENTATION: RIGHT

## 2021-12-28 ASSESSMENT — PAIN DESCRIPTION - ONSET: ONSET: GRADUAL

## 2021-12-28 ASSESSMENT — PAIN DESCRIPTION - DIRECTION: RADIATING_TOWARDS: BACK

## 2021-12-28 ASSESSMENT — PAIN DESCRIPTION - DESCRIPTORS: DESCRIPTORS: ACHING

## 2021-12-28 ASSESSMENT — PAIN DESCRIPTION - LOCATION: LOCATION: HIP

## 2021-12-28 ASSESSMENT — PAIN - FUNCTIONAL ASSESSMENT: PAIN_FUNCTIONAL_ASSESSMENT: PREVENTS OR INTERFERES SOME ACTIVE ACTIVITIES AND ADLS

## 2021-12-28 NOTE — DISCHARGE SUMMARY
[facility] Physician Discharge Summary       [facility] at [facility]. [address] [place] [address]  [phone]          Activity level: As tolerated     Dispo: SNF      Condition on discharge: Stable     Patient ID:  Kiesha Rahman  [id number]  27 y.o.  12/4/1930    Admit date: 12/26/2021    Discharge date and time:  12/28/2021  5:44 PM    Admission Diagnoses: Active Problems:    Ambulatory dysfunction    Intractable back pain  Resolved Problems:    * No resolved hospital problems. *      Discharge Diagnoses: Active Problems:    Ambulatory dysfunction    Intractable back pain  Resolved Problems:    * No resolved hospital problems. *      Consults:  IP CONSULT TO SOCIAL WORK  IP CONSULT TO OM Latam Course:   Patient Kiesha Rahman is a 80 y.o. presented with back pain and ambulatory dysfunction    Ambulatory dysfunction  Acute lower back pain with radiculopathy. DJD lumbar spine. Pain control. Appreciate PT and OT scored 17 out of 24  Discharge to [facility]  MRI showed multiple DJD changes with multilevel central canal stenosis and some articular recess stenosis with severe central canal stenosis L2-L3 through L4-L5 and multilevel neural foraminal stenosis including severe right L3-4 and left L4-5 neuroforaminal stenosis  I consulted neurosurgery, she will follow as an outpatient     Hyponatremia. Presented with sodium of 128. Received some fluids in the ED. Repeat at 135.     Resolved    Discharge Exam:    General Appearance: alert and oriented to person, place and time and in no acute distress  Skin: warm and dry  Head: normocephalic and atraumatic  Eyes: pupils equal, round, and reactive to light, extraocular eye movements intact, conjunctivae normal  Neck: neck supple and non tender without mass   Pulmonary/Chest: clear to auscultation bilaterally- no wheezes, rales or rhonchi, normal air movement, no respiratory distress  Cardiovascular: normal rate, normal S1 and S2 and no carotid bruits  Abdomen: soft, non-tender, non-distended, normal bowel sounds, no masses or organomegaly  Extremities: no cyanosis, no clubbing and no edema  Neurologic: no cranial nerve deficit and speech normal    I/O last 3 completed shifts: In: 640 [P.O.:640]  Out: 2100 [Urine:2100]  No intake/output data recorded. LABS:  Recent Labs     12/26/21  1515 12/27/21  0810 12/28/21  0335   * 135 131*   K 3.8 3.6 3.9   CL 88* 97* 95*   CO2 24 23 25   BUN 15 11 10   CREATININE 0.5 0.6 0.4*   GLUCOSE 104* 182* 95   CALCIUM 9.5 9.3 9.0       Recent Labs     12/26/21  1515 12/27/21  0810 12/28/21  0335   WBC 15.9* 14.3* 13.2*   RBC 4.34 4.70 4.59   HGB 13.4 14.4 14.2   HCT 40.5 43.2 42.9   MCV 93.3 91.9 93.5   MCH 30.9 30.6 30.9   MCHC 33.1 33.3 33.1   RDW 12.2 12.3 12.1    395 330   MPV 11.0 10.9 11.2       No results for input(s): POCGLU in the last 72 hours. Imaging:  XR CHEST (2 VW)    Result Date: 12/26/2021  EXAMINATION: TWO XRAY VIEWS OF THE CHEST 12/26/2021 1:21 pm COMPARISON: None. HISTORY: ORDERING SYSTEM PROVIDED HISTORY: weakness TECHNOLOGIST PROVIDED HISTORY: Reason for exam:->weakness FINDINGS: Frontal and lateral views of the chest.  Cardiomediastinal silhouette and pulmonary vasculature are normal.  There are patchy bibasilar opacities. No pleural effusion or pneumothorax seen. There are degenerative changes in the spine. Patchy bibasilar opacities. Differential includes atelectasis/scarring and infection. XR LUMBAR SPINE (2-3 VIEWS)    Result Date: 12/26/2021  EXAMINATION: THREE XRAY VIEWS OF THE LUMBAR SPINE 12/26/2021 4:36 pm COMPARISON: None. HISTORY: ORDERING SYSTEM PROVIDED HISTORY: back pain TECHNOLOGIST PROVIDED HISTORY: Reason for exam:->back pain FINDINGS: There is S-shaped scoliosis of the spine with dextroscoliosis in the thoracolumbar region and mild-to-moderate levoscoliosis in the lower lumbar spine.   No definite acute fracture or osseous destructive lesion is seen. However, the vertebral bodies are not well visualized in profile due to the scoliosis and therefore evaluation for subtle fractures difficult. There is severe disc space narrowing and subchondral sclerosis with spurring at multiple levels. Posterior facet degenerative changes are noted, most prominent in the lower lumbar spine. There is calcification of the abdominal aorta. 1. No definite acute osseous abnormality seen. 2. Advanced degenerative change. Scoliosis as noted above. Patient Instructions:      Medication List      START taking these medications    HYDROcodone-acetaminophen 5-325 MG per tablet  Commonly known as: NORCO  Take 1 tablet by mouth every 6 hours as needed for Pain for up to 3 days.         CONTINUE taking these medications    levothyroxine 50 MCG tablet  Commonly known as: SYNTHROID     metoprolol succinate 50 MG extended release tablet  Commonly known as: TOPROL XL     simvastatin 20 MG tablet  Commonly known as: ZOCOR     vitamin D 1.25 MG (88791 UT) Caps capsule  Commonly known as: ERGOCALCIFEROL           Where to Get Your Medications      You can get these medications from any pharmacy    Bring a paper prescription for each of these medications  · HYDROcodone-acetaminophen 5-325 MG per tablet           Note that more than 30 minutes was spent in preparing discharge papers, discussing discharge with patient, medication review, etc.    Signed:  Electronically signed by Vazquez Webster MD on 12/28/2021 at 5:44 PM

## 2021-12-28 NOTE — DISCHARGE INSTR - COC
Continuity of Care Form    Patient Name: Alejandro Barrett   :  1930  MRN:  [id number]    Admit date:  2021  Discharge date:  21    Code Status Order: DNR-CCA   Advance Directives:      Admitting Physician:  Azra Aldana MD  PCP: Michael Pak DO    Discharging Nurse: [facility] Unit/Room#: 6860/2691-V  Discharging Unit Phone Number: [phone]    Emergency Contact:   Extended Emergency Contact Information  Primary Emergency Contact: Clotilde Godfrey  Mobile Phone: [phone]  Relation: Child    Past Surgical History:  No past surgical history on file. Immunization History: There is no immunization history on file for this patient. Active Problems:  Patient Active Problem List   Diagnosis Code    Ambulatory dysfunction R26.2    Intractable back pain M54.9       Isolation/Infection:   Isolation            No Isolation          Patient Infection Status       None to display            Nurse Assessment:  Last Vital Signs: BP (!) 168/76   Pulse 97   Temp 97.9 °F (36.6 °C)   Resp 16   Ht 5' 1\" (1.549 m)   Wt 105 lb 9.6 oz (47.9 kg)   SpO2 90%   BMI 19.95 kg/m²     Last documented pain score (0-10 scale): Pain Level: 7  Last Weight:   Wt Readings from Last 1 Encounters:   21 105 lb 9.6 oz (47.9 kg)     Mental Status:  oriented and alert    IV Access:  - None    Nursing Mobility/ADLs:  Walking   Assisted  Transfer  Assisted  Bathing  Assisted  Dressing  Assisted  Toileting  Assisted  Feeding  Independent  Med Admin  Independent  Med Delivery   whole    Wound Care Documentation and Therapy:        Elimination:  Continence:    Bowel: Yes  Bladder: Yes  Urinary Catheter: None   Colostomy/Ileostomy/Ileal Conduit: No       Date of Last BM: ***    Intake/Output Summary (Last 24 hours) at 2021 1010  Last data filed at 2021 0345  Gross per 24 hour   Intake --   Output 1750 ml   Net -1750 ml     I/O last 3 completed shifts:  In: -   Out: [place] [address] [Urine:1750]    Safety Concerns: At Risk for Falls    Impairments/Disabilities:      Vision    Nutrition Therapy:  Current Nutrition Therapy:   - Oral Diet:  General    Routes of Feeding: Oral  Liquids: No Restrictions  Daily Fluid Restriction: no  Last Modified Barium Swallow with Video (Video Swallowing Test): not done    Treatments at the Time of Hospital Discharge:   Respiratory Treatments: ***  Oxygen Therapy:  is not on home oxygen therapy. Ventilator:    - No ventilator support    Rehab Therapies: Physical Therapy and Occupational Therapy  Weight Bearing Status/Restrictions: No weight bearing restirctions  Other Medical Equipment (for information only, NOT a DME order):  walker  Other Treatments: ***    Patient's personal belongings (please select all that are sent with patient):  Glasses    RN SIGNATURE:  Electronically signed by Mira Kaiser RN on 12/29/21 at 9:55 AM EST    CASE MANAGEMENT/SOCIAL WORK SECTION    Inpatient Status Date: ***    Readmission Risk Assessment Score:  Readmission Risk              Risk of Unplanned Readmission:  10           Discharging to Facility/ Agency   Name: [address] at [address] [place] [address] Avenue:  [address] [place] [address] [place] [address]         Phone: [phone]       Fax:          Dialysis Facility (if applicable)   Name:  Address:  Dialysis Schedule:  Phone:  Fax:    / signature: Electronically signed by JAMES Hart on 12/29/2021 at 9:33 AM    PHYSICIAN SECTION    Prognosis: {Prognosis:5247123432}    Condition at Discharge: [address] Patient Condition:[id number]}    Rehab Potential (if transferring to Rehab): {Prognosis:4954295458}    Recommended Labs or Other Treatments After Discharge: ***    Physician Certification: I certify the above information and transfer of Laura Camp  is necessary for the continuing treatment of the diagnosis listed and that she requires [facility] for less 30 days.      Update Admission H&P: {CHP DME Changes in GZDLP:[id number]}    PHYSICIAN SIGNATURE:  Dr Devendra Negrete MD 12/29/21 9:56AM

## 2021-12-28 NOTE — CARE COORDINATION
Updated plan of care. Pt accepted at [facility]. Will need rapid COVID and forms completed.  Pt was vaccinated and boosted, can go via ambulette-o

## 2021-12-28 NOTE — PROGRESS NOTES
P Quality Flow/Interdisciplinary Rounds Progress Note        Quality Flow Rounds held on December 28, 2021    Disciplines Attending:  Bedside Nurse, ,  and Nursing Unit Leadership    Ivette Perry was admitted on 12/26/2021  2:52 PM    Anticipated Discharge Date:  Expected Discharge Date: 12/28/21    Disposition:    Aman Score:  Aman Scale Score: 19    Readmission Risk              Risk of Unplanned Readmission:  10           Discussed patient goal for the day, patient clinical progression, and barriers to discharge.   The following Goal(s) of the Day/Commitment(s) have been identified:  Discharge [address] RN  December 28, 2021

## 2021-12-28 NOTE — PLAN OF CARE
Problem: Falls - Risk of:  Goal: Will remain free from falls  Description: Will remain free from falls  12/28/2021 0040 by Ofelia Mason RN  Outcome: Met This Shift  12/27/2021 1728 by Karen Hernández RN  Outcome: Met This Shift  Goal: Absence of physical injury  Description: Absence of physical injury  12/28/2021 0040 by Ofelia Mason RN  Outcome: Met This Shift  12/27/2021 1728 by Karen Hernández RN  Outcome: Met This Shift     Problem: Pain:  Description: Pain management should include both nonpharmacologic and pharmacologic interventions.   Goal: Pain level will decrease  Description: Pain level will decrease  12/28/2021 0040 by Ofelia Mason RN  Outcome: Met This Shift  12/27/2021 1728 by Karen Hernández RN  Outcome: Met This Shift  Goal: Control of acute pain  Description: Control of acute pain  12/28/2021 0040 by Ofelia Mason RN  Outcome: Met This Shift  12/27/2021 1728 by Karen Hernández RN  Outcome: Met This Shift  Goal: Control of chronic pain  Description: Control of chronic pain  12/27/2021 1728 by Karen Hernández RN  Outcome: Met This Shift

## 2021-12-29 VITALS
DIASTOLIC BLOOD PRESSURE: 77 MMHG | OXYGEN SATURATION: 92 % | HEIGHT: 61 IN | WEIGHT: 105.6 LBS | RESPIRATION RATE: 16 BRPM | TEMPERATURE: 97.9 F | HEART RATE: 101 BPM | BODY MASS INDEX: 19.94 KG/M2 | SYSTOLIC BLOOD PRESSURE: 174 MMHG

## 2021-12-29 LAB
ANION GAP SERPL CALCULATED.3IONS-SCNC: 12 MMOL/L (ref 7–16)
BASOPHILS ABSOLUTE: 0.06 E9/L (ref 0–0.2)
BASOPHILS RELATIVE PERCENT: 0.5 % (ref 0–2)
BUN BLDV-MCNC: 12 MG/DL (ref 6–23)
CALCIUM SERPL-MCNC: 9 MG/DL (ref 8.6–10.2)
CHLORIDE BLD-SCNC: 93 MMOL/L (ref 98–107)
CO2: 26 MMOL/L (ref 22–29)
CREAT SERPL-MCNC: 0.5 MG/DL (ref 0.5–1)
EOSINOPHILS ABSOLUTE: 0.04 E9/L (ref 0.05–0.5)
EOSINOPHILS RELATIVE PERCENT: 0.3 % (ref 0–6)
GFR AFRICAN AMERICAN: >60
GFR NON-AFRICAN AMERICAN: >60 ML/MIN/1.73
GLUCOSE BLD-MCNC: 97 MG/DL (ref 74–99)
HCT VFR BLD CALC: 42.8 % (ref 34–48)
HEMOGLOBIN: 14.1 G/DL (ref 11.5–15.5)
IMMATURE GRANULOCYTES #: 0.07 E9/L
IMMATURE GRANULOCYTES %: 0.6 % (ref 0–5)
LYMPHOCYTES ABSOLUTE: 1.21 E9/L (ref 1.5–4)
LYMPHOCYTES RELATIVE PERCENT: 10 % (ref 20–42)
MCH RBC QN AUTO: 30.3 PG (ref 26–35)
MCHC RBC AUTO-ENTMCNC: 32.9 % (ref 32–34.5)
MCV RBC AUTO: 91.8 FL (ref 80–99.9)
MONOCYTES ABSOLUTE: 1.06 E9/L (ref 0.1–0.95)
MONOCYTES RELATIVE PERCENT: 8.7 % (ref 2–12)
NEUTROPHILS ABSOLUTE: 9.7 E9/L (ref 1.8–7.3)
NEUTROPHILS RELATIVE PERCENT: 79.9 % (ref 43–80)
PDW BLD-RTO: 12.1 FL (ref 11.5–15)
PLATELET # BLD: 375 E9/L (ref 130–450)
PMV BLD AUTO: 11.2 FL (ref 7–12)
POTASSIUM REFLEX MAGNESIUM: 3.7 MMOL/L (ref 3.5–5)
RBC # BLD: 4.66 E12/L (ref 3.5–5.5)
SARS-COV-2, NAAT: NOT DETECTED
SODIUM BLD-SCNC: 131 MMOL/L (ref 132–146)
WBC # BLD: 12.1 E9/L (ref 4.5–11.5)

## 2021-12-29 PROCEDURE — 80048 BASIC METABOLIC PNL TOTAL CA: CPT

## 2021-12-29 PROCEDURE — 97535 SELF CARE MNGMENT TRAINING: CPT

## 2021-12-29 PROCEDURE — G0378 HOSPITAL OBSERVATION PER HR: HCPCS

## 2021-12-29 PROCEDURE — 36415 COLL VENOUS BLD VENIPUNCTURE: CPT

## 2021-12-29 PROCEDURE — 6370000000 HC RX 637 (ALT 250 FOR IP): Performed by: INTERNAL MEDICINE

## 2021-12-29 PROCEDURE — 87635 SARS-COV-2 COVID-19 AMP PRB: CPT

## 2021-12-29 PROCEDURE — 6360000002 HC RX W HCPCS: Performed by: INTERNAL MEDICINE

## 2021-12-29 PROCEDURE — 96372 THER/PROPH/DIAG INJ SC/IM: CPT

## 2021-12-29 PROCEDURE — 2580000003 HC RX 258: Performed by: INTERNAL MEDICINE

## 2021-12-29 PROCEDURE — 85025 COMPLETE CBC W/AUTO DIFF WBC: CPT

## 2021-12-29 RX ADMIN — ENOXAPARIN SODIUM 40 MG: 100 INJECTION SUBCUTANEOUS at 08:02

## 2021-12-29 RX ADMIN — Medication 10 ML: at 08:06

## 2021-12-29 RX ADMIN — HYDROCODONE BITARTRATE AND ACETAMINOPHEN 1 TABLET: 5; 325 TABLET ORAL at 09:01

## 2021-12-29 RX ADMIN — LEVOTHYROXINE SODIUM 50 MCG: 50 TABLET ORAL at 05:34

## 2021-12-29 RX ADMIN — METHOCARBAMOL TABLETS 1000 MG: 500 TABLET, COATED ORAL at 08:01

## 2021-12-29 RX ADMIN — METOPROLOL SUCCINATE 50 MG: 50 TABLET, EXTENDED RELEASE ORAL at 08:02

## 2021-12-29 ASSESSMENT — PAIN SCALES - GENERAL: PAINLEVEL_OUTOF10: 7

## 2021-12-29 NOTE — CARE COORDINATION
Social Work:    Guilherme Coates was arranged to transfer Mr. Cherry Dillard to [facility] today at 12:30 p.m. Social service updated Mrs. Cherry Dillard, her son Matthew Nye, and Elva Paradaard at [address]  (coni,n-17, ambulette done)    Electronically signed by JAMES Riojas on 12/29/2021 at 9:32 AM

## 2021-12-29 NOTE — PROGRESS NOTES
Called Dr. De Jesus Alert Blaise Brooks)  Left Message   Is Doctor going to see Patient in Hospital or as an Outpatient   Waiting for Call Back

## 2021-12-29 NOTE — PROGRESS NOTES
Nurse to nurse called to Carol Ann Elizondo [phone] at [address] [facility]. Paperwork faxed.

## 2021-12-29 NOTE — PROGRESS NOTES
Occupational Therapy  OT BEDSIDE TREATMENT NOTE      Date:2021  Patient Name: Dianne Daniel  MRN: [id number]  : 1930  Room: [address] [place] [address]     Evaluating OT: KENNETH Briones/GURDEEP [id number]       Referring Provider: Nella Hoover MD     Specific Provider Orders/Date: OT eval and treat 21       Diagnosis: Hypochloremia [E87.8]  Hyponatremia [E87.1]  Ambulatory dysfunction [R26.2]  Leukocytosis, unspecified type [D72.829]       Precautions:  Fall Risk      Assessment of current deficits    [x]? Functional mobility            [x]?ADLs           [x]? Strength                  []?Cognition    [x]? Functional transfers          [x]? IADLs         [x]? Safety Awareness   [x]? Endurance    []? Fine Coordination                         [x]? Balance      []? Vision/perception   []? Sensation      []? Gross Motor Coordination             []? ROM           []?  Delirium                   []? Motor Control      OT PLAN OF CARE   OT POC based on physician orders, patient diagnosis and results of clinical assessment     Frequency/Duration 2-4 days/wk for 2 weeks PRN   Specific OT Treatment Interventions to include:   * Instruction/training on adapted ADL techniques and AE recommendations to increase functional independence within precautions       * Training on energy conservation strategies, correct breathing pattern and techniques to improve independence/tolerance for self-care routine  * Functional transfer/mobility training/DME recommendations for increased independence, safety, and fall prevention  * Patient/Family education to increase follow through with safety techniques and functional independence  * Recommendation of environmental modifications for increased safety with functional transfers/mobility and ADLs  * Therapeutic exercise to improve motor endurance, ROM, and functional strength for ADLs/functional transfers  * Therapeutic activities to facilitate/challenge dynamic balance, stand tolerance for increased safety and independence with ADLs     Recommended Adaptive Equipment: TBD      Home Living: Pt lives alone in a 2 story house with 5 FLOR and 1 hand rail. Pt's bedroom and bathroom are on the 1st floor. Pt reports that laundry is in the basement. Bathroom setup: tub/shower with grab bars, elevated commode with grab bars around commode   Equipment owned: cane, wheeled walker     Prior Level of Function: independent with ADLs , independent with IADLs; functional mobility: no device; reports that she had been using cane PRN when having increase R hip pain     Pain Level: Pt reported pain in R hip- Moderate. RN aware and pt received pain medication  Cognition: A&O: 4/4              Memory:  Fair+              Sequencing:  Fair+              Problem solving:  Fair+              Judgement/safety:  Fair+                 Functional Assessment:  AM-PAC Daily Activity Raw Score: 19/24    Initial Eval Status  Date: 12/27/21 Treatment Status  Date:12/29/21 STGs = LTGs  Time frame: 10-14 days   Feeding Set up       Grooming CGA to stand at sink for hand and oral hygiene  Pt occasionally leaning on sink for BUE support SBA  Standing at sink and S/U seated  Mod I/I   UB Dressing CGA   Mod I/I   LB Dressing SBA to doff/don socks seated in chair  CGA for simulated pants    Mod I/I-with use of AD as appropriate/needed   Bathing CGA   Mod I/I -with use of AD as appropriate/needed   Toileting CGA/SBA for clothing management CGA  Pt performed emilio care seated.   Pt managed brief over hips while standing with use of grab bar for support  I    Bed Mobility  Pt in chair   Supine <> sit: SBA     Functional Transfers Min A/CGA with wheeled walker  Sit<>stand from chair  Sit<>stand from commode  Cues for hand placement and safe technique  STS: CGA  From EOB and commode I    Functional Mobility CGA with wheeled walker  To and from bathroom  Cues for safe wheeled walker management and navigation [facility] using ww in room I -with device as needed to